# Patient Record
Sex: MALE | Race: WHITE | NOT HISPANIC OR LATINO | Employment: UNEMPLOYED | URBAN - METROPOLITAN AREA
[De-identification: names, ages, dates, MRNs, and addresses within clinical notes are randomized per-mention and may not be internally consistent; named-entity substitution may affect disease eponyms.]

---

## 2023-09-27 ENCOUNTER — HOSPITAL ENCOUNTER (OUTPATIENT)
Dept: DATA CONVERSION | Facility: HOSPITAL | Age: 33
Discharge: HOME | End: 2023-09-27

## 2023-09-27 DIAGNOSIS — M25.572 PAIN IN LEFT ANKLE AND JOINTS OF LEFT FOOT: ICD-10-CM

## 2023-09-27 DIAGNOSIS — S93.402A SPRAIN OF UNSPECIFIED LIGAMENT OF LEFT ANKLE, INITIAL ENCOUNTER: ICD-10-CM

## 2023-09-27 DIAGNOSIS — M25.772 OSTEOPHYTE, LEFT ANKLE: ICD-10-CM

## 2023-09-27 DIAGNOSIS — F17.210 NICOTINE DEPENDENCE, CIGARETTES, UNCOMPLICATED: ICD-10-CM
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
## 2024-01-17 ENCOUNTER — HOSPITAL ENCOUNTER (EMERGENCY)
Facility: HOSPITAL | Age: 34
Discharge: HOME | End: 2024-01-17
Attending: EMERGENCY MEDICINE

## 2024-01-17 VITALS
SYSTOLIC BLOOD PRESSURE: 159 MMHG | WEIGHT: 195 LBS | OXYGEN SATURATION: 98 % | DIASTOLIC BLOOD PRESSURE: 100 MMHG | RESPIRATION RATE: 18 BRPM | HEIGHT: 73 IN | HEART RATE: 85 BPM | BODY MASS INDEX: 25.84 KG/M2

## 2024-01-17 DIAGNOSIS — K04.7 DENTAL ABSCESS: Primary | ICD-10-CM

## 2024-01-17 PROCEDURE — 2500000001 HC RX 250 WO HCPCS SELF ADMINISTERED DRUGS (ALT 637 FOR MEDICARE OP): Performed by: EMERGENCY MEDICINE

## 2024-01-17 PROCEDURE — 99284 EMERGENCY DEPT VISIT MOD MDM: CPT | Performed by: EMERGENCY MEDICINE

## 2024-01-17 PROCEDURE — 96372 THER/PROPH/DIAG INJ SC/IM: CPT

## 2024-01-17 PROCEDURE — 99283 EMERGENCY DEPT VISIT LOW MDM: CPT | Mod: 25

## 2024-01-17 PROCEDURE — 2500000004 HC RX 250 GENERAL PHARMACY W/ HCPCS (ALT 636 FOR OP/ED): Performed by: EMERGENCY MEDICINE

## 2024-01-17 RX ORDER — NAPROXEN 500 MG/1
500 TABLET ORAL 2 TIMES DAILY PRN
Qty: 14 TABLET | Refills: 0 | Status: SHIPPED | OUTPATIENT
Start: 2024-01-17 | End: 2024-01-24

## 2024-01-17 RX ORDER — AMOXICILLIN AND CLAVULANATE POTASSIUM 875; 125 MG/1; MG/1
875 TABLET, FILM COATED ORAL EVERY 12 HOURS
Qty: 20 TABLET | Refills: 0 | Status: SHIPPED | OUTPATIENT
Start: 2024-01-17 | End: 2024-01-27

## 2024-01-17 RX ORDER — ACETAMINOPHEN 325 MG/1
650 TABLET ORAL ONCE
Status: COMPLETED | OUTPATIENT
Start: 2024-01-17 | End: 2024-01-17

## 2024-01-17 RX ORDER — AMOXICILLIN AND CLAVULANATE POTASSIUM 875; 125 MG/1; MG/1
1 TABLET, FILM COATED ORAL ONCE
Status: COMPLETED | OUTPATIENT
Start: 2024-01-17 | End: 2024-01-17

## 2024-01-17 RX ORDER — KETOROLAC TROMETHAMINE 30 MG/ML
30 INJECTION, SOLUTION INTRAMUSCULAR; INTRAVENOUS ONCE
Status: COMPLETED | OUTPATIENT
Start: 2024-01-17 | End: 2024-01-17

## 2024-01-17 RX ADMIN — AMOXICILLIN AND CLAVULANATE POTASSIUM 875 MG: 875; 125 TABLET, FILM COATED ORAL at 07:35

## 2024-01-17 RX ADMIN — ACETAMINOPHEN 650 MG: 325 TABLET ORAL at 07:35

## 2024-01-17 RX ADMIN — KETOROLAC TROMETHAMINE 30 MG: 30 INJECTION, SOLUTION INTRAMUSCULAR at 07:35

## 2024-01-17 ASSESSMENT — PAIN SCALES - GENERAL: PAINLEVEL_OUTOF10: 10 - WORST POSSIBLE PAIN

## 2024-01-17 ASSESSMENT — COLUMBIA-SUICIDE SEVERITY RATING SCALE - C-SSRS
2. HAVE YOU ACTUALLY HAD ANY THOUGHTS OF KILLING YOURSELF?: NO
1. IN THE PAST MONTH, HAVE YOU WISHED YOU WERE DEAD OR WISHED YOU COULD GO TO SLEEP AND NOT WAKE UP?: NO
6. HAVE YOU EVER DONE ANYTHING, STARTED TO DO ANYTHING, OR PREPARED TO DO ANYTHING TO END YOUR LIFE?: NO

## 2024-01-17 ASSESSMENT — PAIN - FUNCTIONAL ASSESSMENT: PAIN_FUNCTIONAL_ASSESSMENT: 0-10

## 2024-01-17 NOTE — DISCHARGE INSTRUCTIONS
Pt c/o abd pain LLQ, states he was seen here last week for same symptoms.  Was told he had acute diverticulitis.  Pt c/o left testicular \"sensation\".  States its intermittent, denies loss of bladder bowel control, denies testicular swelling.  Denies CP, SOB, n/v, dairrhea   The cause of your dental pain is a decayed tooth that most likely has developed an infection. You will need to follow up with a dentist as soon as possible for further care as often such causes of tooth pain will reoccur without appropriate intervention such as a filling, root canal, or tooth extraction which can only be perfomed by a dentist. Take antibiotics as prescribed until gone. Tylenol or ibuprofen or any prescribed medications as needed for pain.

## 2024-01-17 NOTE — ED PROVIDER NOTES
HPI   Chief Complaint   Patient presents with    Dental Pain       33-year-old male presents for left lower dental pain ongoing since yesterday.  Denies trauma.  States he has had infections in the past and knows he needs to see a dentist because he has bad teeth.  Denies trauma.  No difficulty speaking or swallowing.  No fever.  No neck pain or stiffness.                          [place] Coma Scale Score: 15                  Patient History   History reviewed. No pertinent past medical history.  History reviewed. No pertinent surgical history.  No family history on file.  Social History     Tobacco Use    Smoking status: Unknown    Smokeless tobacco: Not on file   Substance Use Topics    Alcohol use: Not on file    Drug use: Not on file       Physical Exam   ED Triage Vitals [01/17/24 0633]   Temp Heart Rate Resp BP   -- 104 15 (!) 159/100      SpO2 Temp src Heart Rate Source Patient Position   97 % -- Monitor Sitting      BP Location FiO2 (%)     Left arm --       Physical Exam  Vitals and nursing note reviewed.     GENERAL APPEARANCE: Awake, alert, no acute distress, nontoxic  VITALS: Reviewed, As per triage notes.  HEENT: Normocephalic; atraumatic. Wet mucous membranes. Poor dentition, multiple caries and decayed teeth including substantial erosion of multiple teeth. There is mild erythema at the base of the gums along the left lower canine and first premolar where the patient is exhibiting pain with tenderness to percussion of the tooth. Patient is tolerating oral secretions well. No fluctuant masses, no trismus, no drooling, no peritonsillar abscess, no uvula shift, no tripod position. No  tongue, or sublingual edema, small amount of palpable soft tissue swelling along the left lateral mental region. No tenderness of the floor of the mouth. No trismus.   NECK: Trachea midline. No cervical lymphadenopathy noted. No edema.  RESPIRATORY: No respiratory distress, lungs are clear to auscultation bilaterally without  wheezes, rhonchi, or crackles. No stridor  CARDIOVASCULAR: Heart is regular rate and rhythm and without murmur.  SKIN: warm and dry, no rashes noted    ED Course & MDM   Diagnoses as of 01/17/24 0736   Dental abscess       Medical Decision Making  Patient presents for dental pain.  Based on the history and physical examination I estimate there is a low risk for anaphylaxis, deep space infection such as Andrew's angina or retropharyngeal abscess, epiglottitis, meningitis, or other etiologies that could cause airway compromise. Given this although I considered the need for advanced imaging such as CT soft tissue neck to evaluate for deep space infections clinically this does not appear warranted at this time.   There is also no evidence for sepsis or toxicity.  Clinical presentation most consistent with dental infection likely pulpitis or possible dental abscess. I have discussed the diagnosis and risks with the patient, and we agree with discharge home to follow up closely with a dentist.  Discussed reasons to return to the emergency department including the symptoms which are most concerning such as changing or worsening pain, difficulty swallowing or breathing, neck stiffness, or fever that would necessitate immediate return.    Patient treated with IM Toradol, oral Tylenol, first dose of Augmentin in the emergency departement, remains stable. Will prescribe augmentin to cover possible dental infection until definitive management by a dentist is possible        Procedure  Procedures     Yanet Singh MD  01/17/24 0736

## 2024-01-17 NOTE — Clinical Note
Juan Post was seen and treated in our emergency department on 1/17/2024.  He may return to work on 01/18/2024.       If you have any questions or concerns, please don't hesitate to call.      Yanet Singh MD

## 2024-01-17 NOTE — ED TRIAGE NOTES
Pt presents to the ED with c/c of left sided facial swelling and mouth pain due to a broken tooth.

## 2025-03-29 ENCOUNTER — APPOINTMENT (OUTPATIENT)
Dept: RADIOLOGY | Facility: HOSPITAL | Age: 35
End: 2025-03-29

## 2025-03-29 ENCOUNTER — HOSPITAL ENCOUNTER (EMERGENCY)
Facility: HOSPITAL | Age: 35
Discharge: HOME | End: 2025-03-29
Attending: STUDENT IN AN ORGANIZED HEALTH CARE EDUCATION/TRAINING PROGRAM

## 2025-03-29 VITALS
WEIGHT: 195 LBS | RESPIRATION RATE: 16 BRPM | HEIGHT: 73 IN | DIASTOLIC BLOOD PRESSURE: 74 MMHG | OXYGEN SATURATION: 97 % | SYSTOLIC BLOOD PRESSURE: 112 MMHG | BODY MASS INDEX: 25.84 KG/M2 | HEART RATE: 78 BPM | TEMPERATURE: 99.5 F

## 2025-03-29 DIAGNOSIS — S20.212A CHEST WALL CONTUSION, LEFT, INITIAL ENCOUNTER: Primary | ICD-10-CM

## 2025-03-29 DIAGNOSIS — G89.11 ACUTE PAIN DUE TO TRAUMA: ICD-10-CM
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
## 2025-03-29 DIAGNOSIS — M89.8X1 PAIN OF LEFT CLAVICLE: ICD-10-CM

## 2025-03-29 PROCEDURE — 71250 CT THORAX DX C-: CPT

## 2025-03-29 PROCEDURE — 72125 CT NECK SPINE W/O DYE: CPT | Performed by: RADIOLOGY

## 2025-03-29 PROCEDURE — 70450 CT HEAD/BRAIN W/O DYE: CPT

## 2025-03-29 PROCEDURE — 72125 CT NECK SPINE W/O DYE: CPT

## 2025-03-29 PROCEDURE — 71250 CT THORAX DX C-: CPT | Performed by: RADIOLOGY

## 2025-03-29 PROCEDURE — S4991 NICOTINE PATCH NONLEGEND: HCPCS | Performed by: STUDENT IN AN ORGANIZED HEALTH CARE EDUCATION/TRAINING PROGRAM

## 2025-03-29 PROCEDURE — 70450 CT HEAD/BRAIN W/O DYE: CPT | Performed by: RADIOLOGY

## 2025-03-29 PROCEDURE — 99284 EMERGENCY DEPT VISIT MOD MDM: CPT | Mod: 25 | Performed by: STUDENT IN AN ORGANIZED HEALTH CARE EDUCATION/TRAINING PROGRAM

## 2025-03-29 PROCEDURE — 2500000002 HC RX 250 W HCPCS SELF ADMINISTERED DRUGS (ALT 637 FOR MEDICARE OP, ALT 636 FOR OP/ED): Performed by: STUDENT IN AN ORGANIZED HEALTH CARE EDUCATION/TRAINING PROGRAM

## 2025-03-29 PROCEDURE — 2500000001 HC RX 250 WO HCPCS SELF ADMINISTERED DRUGS (ALT 637 FOR MEDICARE OP): Performed by: STUDENT IN AN ORGANIZED HEALTH CARE EDUCATION/TRAINING PROGRAM

## 2025-03-29 RX ORDER — ACETAMINOPHEN 500 MG
1000 TABLET ORAL EVERY 6 HOURS PRN
Qty: 30 TABLET | Refills: 0 | Status: SHIPPED | OUTPATIENT
Start: 2025-03-29 | End: 2025-04-28

## 2025-03-29 RX ORDER — OXYCODONE AND ACETAMINOPHEN 5; 325 MG/1; MG/1
1 TABLET ORAL ONCE
Status: COMPLETED | OUTPATIENT
Start: 2025-03-29 | End: 2025-03-29

## 2025-03-29 RX ORDER — METHOCARBAMOL 500 MG/1
500 TABLET, FILM COATED ORAL 3 TIMES DAILY
Qty: 30 TABLET | Refills: 0 | Status: SHIPPED | OUTPATIENT
Start: 2025-03-29 | End: 2025-04-08

## 2025-03-29 RX ORDER — IBUPROFEN 200 MG
1 TABLET ORAL ONCE
Status: DISCONTINUED | OUTPATIENT
Start: 2025-03-29 | End: 2025-03-29 | Stop reason: HOSPADM

## 2025-03-29 RX ORDER — IBUPROFEN 600 MG/1
600 TABLET ORAL EVERY 6 HOURS PRN
Qty: 30 TABLET | Refills: 0 | Status: SHIPPED | OUTPATIENT
Start: 2025-03-29 | End: 2025-04-28

## 2025-03-29 RX ADMIN — OXYCODONE HYDROCHLORIDE AND ACETAMINOPHEN 1 TABLET: 5; 325 TABLET ORAL at 03:27

## 2025-03-29 RX ADMIN — NICOTINE 1 PATCH: 21 PATCH TRANSDERMAL at 04:21

## 2025-03-29 ASSESSMENT — PAIN - FUNCTIONAL ASSESSMENT
PAIN_FUNCTIONAL_ASSESSMENT: 0-10
PAIN_FUNCTIONAL_ASSESSMENT: 0-10

## 2025-03-29 ASSESSMENT — COLUMBIA-SUICIDE SEVERITY RATING SCALE - C-SSRS
1. IN THE PAST MONTH, HAVE YOU WISHED YOU WERE DEAD OR WISHED YOU COULD GO TO SLEEP AND NOT WAKE UP?: NO
6. HAVE YOU EVER DONE ANYTHING, STARTED TO DO ANYTHING, OR PREPARED TO DO ANYTHING TO END YOUR LIFE?: NO
2. HAVE YOU ACTUALLY HAD ANY THOUGHTS OF KILLING YOURSELF?: NO

## 2025-03-29 ASSESSMENT — PAIN SCALES - GENERAL
PAINLEVEL_OUTOF10: 8
PAINLEVEL_OUTOF10: 5 - MODERATE PAIN

## 2025-03-29 ASSESSMENT — PAIN DESCRIPTION - ORIENTATION: ORIENTATION: LEFT

## 2025-03-29 ASSESSMENT — PAIN DESCRIPTION - LOCATION: LOCATION: SHOULDER
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
## 2025-03-29 NOTE — DISCHARGE INSTRUCTIONS
Thank you for allowing myself and the team to take care of you during your emergency situation and addressing your health concerns.    You were evaluated for traumatic injuries.     Your likely condition/diagnosis: Chest wall/clavicle contusion/bruising    During your visit in the emergency department you were evaluated for your presenting symptoms.  All efforts were made to identify the source of your symptoms and identify any life-threatening conditions.  These life-threatening conditions that were attempted to be identified and medically managed/treated include but not limited to traumatic bleed inside your head/skull involving your brain, facial fractures, axial skeleton/spine (cervical/thoracic/lumbar) fractures/malalignment or involving spinal cord, appendicular skeleton/extremities fracture/dislocation, pneumothorax/hemothorax (blood or air in your chest), intra-abdominal trauma (solid organ injury or intestine injury) to include bleeding inside your abdomen, pelvic fracture or bleeding in your pelvis to include genital/bladder injury.  Additionally, you may be experiencing symptoms that are non-life-threatening but are uncomfortable and disruptive to your everyday life.  All efforts were made to manage your symptoms while in the emergency department along with appropriate at home therapy for symptomatic management during your recovery. Please be aware that not all of the conditions explained/discussed in these discharge instructions are applicable to your condition but encompass many of the conditions that were evaluated for during your ED encounter.  You will likely experience significant body discomfort/pain that is often termed as acute pain syndrome which involves muscle soreness and bruising with potential superficial abrasions associated with your traumatic event.    During your evaluation and assessment, all measures were taken to evaluate you and address your health concerns to identify dangerous and  life threatening health conditions. It is not possible to identify all health conditions or pathologies and eliminate any chance of unfavorable outcomes while in the Emergency Department. My team encourages you to be vigilant with your health and follow-up with the appropriate providers outlined in your discharge paperwork. Please return to the Emergency Department if you feel that your health has not improved or experiencing worsening symptoms.    Special instructions please take the medication as prescribed.  Please make a follow-up point with your primary care physician to further manage symptoms address your health concerns.    Incidental findings:  None